# Patient Record
Sex: FEMALE | Race: WHITE | URBAN - METROPOLITAN AREA
[De-identification: names, ages, dates, MRNs, and addresses within clinical notes are randomized per-mention and may not be internally consistent; named-entity substitution may affect disease eponyms.]

---

## 2024-08-08 ENCOUNTER — HOSPITAL ENCOUNTER (EMERGENCY)
Facility: HOSPITAL | Age: 19
Discharge: HOME | End: 2024-08-08
Attending: STUDENT IN AN ORGANIZED HEALTH CARE EDUCATION/TRAINING PROGRAM

## 2024-08-08 ENCOUNTER — APPOINTMENT (OUTPATIENT)
Dept: RADIOLOGY | Facility: HOSPITAL | Age: 19
End: 2024-08-08

## 2024-08-08 VITALS
TEMPERATURE: 97.9 F | BODY MASS INDEX: 31.17 KG/M2 | SYSTOLIC BLOOD PRESSURE: 122 MMHG | OXYGEN SATURATION: 100 % | WEIGHT: 175.93 LBS | DIASTOLIC BLOOD PRESSURE: 69 MMHG | HEART RATE: 62 BPM | RESPIRATION RATE: 18 BRPM | HEIGHT: 63 IN

## 2024-08-08 DIAGNOSIS — S63.695A OTHER SPRAIN OF LEFT RING FINGER, INITIAL ENCOUNTER: ICD-10-CM

## 2024-08-08 DIAGNOSIS — S43.401A SPRAIN OF RIGHT SHOULDER, UNSPECIFIED SHOULDER SPRAIN TYPE, INITIAL ENCOUNTER: ICD-10-CM

## 2024-08-08 DIAGNOSIS — S09.90XA CLOSED HEAD INJURY, INITIAL ENCOUNTER: Primary | ICD-10-CM

## 2024-08-08 DIAGNOSIS — S13.9XXA NECK SPRAIN, INITIAL ENCOUNTER: ICD-10-CM

## 2024-08-08 PROCEDURE — 70450 CT HEAD/BRAIN W/O DYE: CPT

## 2024-08-08 PROCEDURE — 73030 X-RAY EXAM OF SHOULDER: CPT | Mod: RIGHT SIDE | Performed by: RADIOLOGY

## 2024-08-08 PROCEDURE — 70486 CT MAXILLOFACIAL W/O DYE: CPT

## 2024-08-08 PROCEDURE — 73030 X-RAY EXAM OF SHOULDER: CPT | Mod: RT

## 2024-08-08 PROCEDURE — 99285 EMERGENCY DEPT VISIT HI MDM: CPT | Mod: 25

## 2024-08-08 PROCEDURE — 73140 X-RAY EXAM OF FINGER(S): CPT | Mod: LEFT SIDE | Performed by: RADIOLOGY

## 2024-08-08 PROCEDURE — 72125 CT NECK SPINE W/O DYE: CPT

## 2024-08-08 PROCEDURE — 73140 X-RAY EXAM OF FINGER(S): CPT | Mod: LT

## 2024-08-08 PROCEDURE — 76377 3D RENDER W/INTRP POSTPROCES: CPT

## 2024-08-08 RX ORDER — ACETAMINOPHEN 325 MG/1
650 TABLET ORAL ONCE
Status: COMPLETED | OUTPATIENT
Start: 2024-08-08 | End: 2024-08-08

## 2024-08-08 RX ADMIN — ACETAMINOPHEN 650 MG: 325 TABLET ORAL at 13:04

## 2024-08-08 ASSESSMENT — PAIN DESCRIPTION - DESCRIPTORS: DESCRIPTORS: STABBING;THROBBING

## 2024-08-08 ASSESSMENT — PAIN SCALES - GENERAL
PAINLEVEL_OUTOF10: 6
PAINLEVEL_OUTOF10: 3

## 2024-08-08 ASSESSMENT — PAIN DESCRIPTION - ONSET: ONSET: SUDDEN

## 2024-08-08 ASSESSMENT — PAIN DESCRIPTION - ORIENTATION: ORIENTATION: RIGHT

## 2024-08-08 ASSESSMENT — PAIN DESCRIPTION - FREQUENCY: FREQUENCY: INTERMITTENT

## 2024-08-08 ASSESSMENT — PAIN DESCRIPTION - PROGRESSION: CLINICAL_PROGRESSION: NOT CHANGED

## 2024-08-08 ASSESSMENT — PAIN DESCRIPTION - PAIN TYPE: TYPE: ACUTE PAIN

## 2024-08-08 ASSESSMENT — COLUMBIA-SUICIDE SEVERITY RATING SCALE - C-SSRS
1. IN THE PAST MONTH, HAVE YOU WISHED YOU WERE DEAD OR WISHED YOU COULD GO TO SLEEP AND NOT WAKE UP?: NO
2. HAVE YOU ACTUALLY HAD ANY THOUGHTS OF KILLING YOURSELF?: NO
6. HAVE YOU EVER DONE ANYTHING, STARTED TO DO ANYTHING, OR PREPARED TO DO ANYTHING TO END YOUR LIFE?: NO

## 2024-08-08 ASSESSMENT — PAIN - FUNCTIONAL ASSESSMENT
PAIN_FUNCTIONAL_ASSESSMENT: 0-10
PAIN_FUNCTIONAL_ASSESSMENT: 0-10

## 2024-08-08 ASSESSMENT — PAIN DESCRIPTION - LOCATION: LOCATION: SHOULDER

## 2024-08-08 NOTE — Clinical Note
Karan Chowdhury was seen and treated in our emergency department on 8/8/2024.  She may return to work on 08/11/2024.  1-3 days if needed      If you have any questions or concerns, please don't hesitate to call.      Maryann Ch, APRN-CNP

## 2024-08-08 NOTE — ED PROVIDER NOTES
Supervisory note:  Patient seen in conjunction with Maryann Ch NP.  Patient presents after a fall.  She states that she was going down some stairs and her 6 dogs ran down at the same time.  She tripped on one of the dogs and fell down the stairs.  She reports pain in her right shoulder and left ring finger.  She is otherwise healthy and does not use blood thinning medications.  On examination, patient is awake and alert.  There is some bruising noted on the left ring finger.  No significant tenderness to palpation of posterior aspect of left shoulder although the anterior aspect is somewhat tender.  Full range of flexion extension, and abduction of right shoulder.  Strength of flexion and extension of left ring finger is intact.    Imaging studies without acute findings.  Patient advised to use Tylenol and ibuprofen and follow-up with primary care physician.  Patient advised that if pain persists and shoulder, that she should follow-up with orthopedics.  I personally saw the patient and made/approved the management plan and take responsiblity for the patient management.  Parts of this chart were completed with dictation software, please excuse any errors in transcription.     Jay Larsen MD  08/08/24 [id number]

## 2024-08-08 NOTE — DISCHARGE INSTRUCTIONS
Close head injury precautions.  Limit your screen time TV time.  Sit in a quiet room a couple hours a day to allow the brain to rest.  Follow-up with primary care physician in 2 days for reevaluation  Light stretching of the shoulder neck and finger avoid painful activity.  If pain persist follow-up with orthopedics follow-up with primary care physician in 2 days for reevaluation  Ice for 20 minutes at a time 4 times a day and after activity  Where finger splint for support and comfort for couple days then as needed  Try scheduling Tylenol and Motrin to help with pain do not go the recommended daily dosage return with any worsening symptoms or concerns

## 2024-08-08 NOTE — ED PROVIDER NOTES
Department of Emergency Medicine   ED  Provider Note  Admit Date/RoomTime: 8/8/2024 12:49 PM  ED Room: ST26/ST26        History of Present Illness:  Chief Complaint   Patient presents with    Fall     At 0930 I fell down 9 stairs the stairs were carpeted the landing was tile I hit my lt chin and rt shoulder and my lt ring finger the pain in my shoulder is the worst it is stabbing on movement and throbbing just sitting here the rest of my body is sore          Karan Chowdhury is a 19 y.o. female denies chronic medical illnesses presents to the emergency department complaints of fall down 9 steps.  Patient reports around 930 this morning she tripped over her dog tumbling down 9 steps.  She was dazed after the fall saw stars.  No loss of consciousness no nausea no vomiting no complaints of headache.  Complains of a tightness in the right side of her neck but believes is compensating due to her pain in her right shoulder.  She denies pain with movement of the neck.  No loss of bowel or bladder control no numbness or tingling to the groin.  Complains of pain to the right shoulder with movement.  Family present reports swelling to the right shoulder.  No numbness or tingling no weakness or change in temperature or color.  Reports difficulty moving the arm.  She is right-hand dominant.  Complains of left ring finger swelling and bruising difficulty moving.  States she was in her normal state of health prior to this no one is harming her.  She denies chest pain shortness of breath dizziness nausea vomiting.  Denies pregnancy.  Complains of left jaw pain.  No difficulty opening or shutting the jaw.  Her teeth are intact.  No difficulty swallowing.    Review of Systems:   Pertinent positives and negatives are stated within HPI, all other systems reviewed and are negative.        --------------------------------------------- PAST HISTORY ---------------------------------------------  Past Medical History:  has no past medical  history on file.  Past Surgical History:  has no past surgical history on file.  Social History:    Family History: family history is not on file.. Unless otherwise noted, family history is non contributory  The patient’s home medications have been reviewed.  Allergies: Patient has no known allergies.        ---------------------------------------------------PHYSICAL EXAM--------------------------------------    GENERAL APPEARANCE: Awake and alert.   VITAL SIGNS: As per the nurses' triage record.   HEENT: Normocephalic, atraumatic.  No raccoon eyes or strong signs noted.  No epistaxis noted.  No bite to the tongue or lip.  No hemotympanum noted.  No septal hematoma noted.  No contusions abrasions lacerations noted to the face or scalp.  Extraocular muscles are intact. Pupils equal round and reactive to light. Conjunctiva are pink. Negative scleral icterus. Mucous membranes are moist. Tongue in the midline. Pharynx was without erythema or exudates, uvula midline teeth are intact.  No bruising noted to the face or jaw.  Full range of motion of the jaw.  No pain palpation.  NECK: Soft Nontender and supple, full gross ROM, no meningeal signs.  No nuchal rigidity stridor or trismus noted.  No pain palpation of the cervical spine full range of motion without pain.  tenderness to palpation over the right sternomastoid muscle.  No pain with palpation over the left sternomastoid muscle or trapezius muscles.  CHEST: Nontender to palpation. Clear to auscultation bilaterally. No rales, rhonchi, or wheezing.  No flail chest noted  HEART: S1, S2. Regular rate and rhythm. No murmurs, gallops or rubs.  Strong and equal pulses in the extremities.   ABDOMEN: Soft, nontender, nondistended, positive bowel sounds, no palpable masses.  MUSCULCSKELETAL:   Right upper extremity pain with crossarm test along the anterior portion of the shoulder.  Shoulders are symmetrical.  No bruising swelling or redness or warmth noted over the clavicles.  " No redness bruising or swelling noted to the shoulder.  Able to perform liftoff test.  Also able to raise the arm above the head.  Handgrip is strong hands better strong radial pulse strong and intact sensation intact.  No pain with internal or external rotation of the elbow.  No bruising or edema noted to the elbow  Left upper extremity left fourth digit.  Bruising and swelling noted between the PIP and DIP joint.  Tenderness to palpation.  Limited range of motion at the DIP joint.  Good strength against resistance cap refill less than 2 sensation intact.  Radial pulse strong intact cap refill less than 2 sensation intact.  No pain with palpation over the metacarpals.  Full range of motion of the wrist without pain.  No bruising or edema noted full range of motion of the elbow and shoulder.  Ambulates with no difficulty peripheral pulses intact sensation intact  NEUROLOGICAL: Awake, alert and oriented x 3. Power intact in the upper and lower extremities. Sensation is intact to light touch in the upper and lower extremities.   IMMUNOLOGICAL: No lymphatic streaking noted   DERM: No petechiae, rashes, or ecchymoses.          ------------------------- NURSING NOTES AND VITALS REVIEWED ---------------------------  The nursing notes within the ED encounter and vital signs as below have been reviewed by myself  /69 (BP Location: Left arm, Patient Position: Sitting)   Pulse 62   Temp 36.6 °C (97.9 °F) (Temporal)   Resp 18   Ht 1.6 m (5' 3\")   Wt 79.8 kg (175 lb 14.8 oz)   LMP 08/01/2024   SpO2 100%   BMI 31.16 kg/m²     Oxygen Saturation Interpretation: 100% room air      The patient’s available past medical records and past encounters were reviewed.          -----------------------DIAGNOSTIC RESULTS------------------------  LABS:    Labs Reviewed - No data to display    As interpreted by me, the above displayed labs are abnormal. All other labs obtained during this visit were within normal range or not " returned as of this dictation.      CT head wo IV contrast   Final Result   Face: No acute facial bone fracture visualized.        Head: No acute fracture or intracranial hemorrhage.        Cervical spine: No acute fracture or subluxation.        MACRO:   None        Signed by: Martin Lau 8/8/2024 1:59 PM   Dictation workstation:   [id number]      CT cervical spine wo IV contrast   Final Result   Face: No acute facial bone fracture visualized.        Head: No acute fracture or intracranial hemorrhage.        Cervical spine: No acute fracture or subluxation.        MACRO:   None        Signed by: Martin Lau 8/8/2024 1:59 PM   Dictation workstation:   [id number]      CT maxillofacial bones wo IV contrast   Final Result   Face: No acute facial bone fracture visualized.        Head: No acute fracture or intracranial hemorrhage.        Cervical spine: No acute fracture or subluxation.        MACRO:   None        Signed by: Martin Lau 8/8/2024 1:59 PM   Dictation workstation:   [id number]      XR shoulder right 2+ views   Final Result   1. No evidence of acute fracture or dislocation.        MACRO:   None.        Signed by: Alejandro Giordano 8/8/2024 1:35 PM   Dictation workstation:   [id number]      XR fingers left 2+ views   Final Result   1. No fracture or dislocation identified.        MACRO:   None.        Signed by: Alejandro Giordano 8/8/2024 1:34 PM   Dictation workstation:   [id number]              CT head wo IV contrast   Final Result   Face: No acute facial bone fracture visualized.        Head: No acute fracture or intracranial hemorrhage.        Cervical spine: No acute fracture or subluxation.        MACRO:   None        Signed by: Martin Lau 8/8/2024 1:59 PM   Dictation workstation:   [id number]      CT cervical spine wo IV contrast   Final Result   Face: No acute facial bone fracture visualized.        Head: No acute fracture or intracranial hemorrhage.        Cervical spine: No acute  fracture or subluxation.        MACRO:   None        Signed by: Martin Lau 8/8/2024 1:59 PM   Dictation workstation:   [id number]      CT maxillofacial bones wo IV contrast   Final Result   Face: No acute facial bone fracture visualized.        Head: No acute fracture or intracranial hemorrhage.        Cervical spine: No acute fracture or subluxation.        MACRO:   None        Signed by: Martin Lau 8/8/2024 1:59 PM   Dictation workstation:   [id number]      XR shoulder right 2+ views   Final Result   1. No evidence of acute fracture or dislocation.        MACRO:   None.        Signed by: Alejandro Giordano 8/8/2024 1:35 PM   Dictation workstation:   [id number]      XR fingers left 2+ views   Final Result   1. No fracture or dislocation identified.        MACRO:   None.        Signed by: Alejandro Giordano 8/8/2024 1:34 PM   Dictation workstation:   [id number]              ------------------------------ ED COURSE/MEDICAL DECISION MAKING----------------------  Medical Decision Making:   Exam: A medically appropriate exam performed, outlined above, given the known history and presentation.    History obtained from: Review medical record nursing notes patient      Social Determinants of Health considered during this visit: Takes care of herself at home      PAST MEDICAL HISTORY/Chronic Conditions Affecting Care     has no past medical history on file.       CC/HPI Summary, Social Determinants of health, Records Reviewed, DDx, testing done/not done, ED Course, Reassessment, disposition considerations/shared decision making with patient, consults, disposition:   Presents with fall down 9 steps earlier today.  Complains of left jaw pain, neck tenderness, left finger pain and right shoulder pain  Plan  Tylenol    CT brain Face: No acute facial bone fracture visualized.      Head: No acute fracture or intracranial hemorrhage.      Cervical spine: No acute fracture or subluxation.    X-ray left finger 1. No fracture  or dislocation identified.   X-ray right shoulder 1. No evidence of acute fracture or dislocation.   Medical Decision Making/Differential Diagnosis:  Differentials include not limited to closed head injury versus intercranial bleed or cervical strain for cervical fracture versus facial fracture versus left finger fracture versus contusion versus sprain strain versus right shoulder fracture versus contusion versus sprain strain.  Full range of motion of all extremities low concern for dislocation no concerns of compartment syndrome.  She is neurologically intact.  Patient denies anyone harming her.  CT of the head showed no acute fracture or intracranial hemorrhage.  CT of the face showed no acute facial bone fracture visualized.  CT cervical no acute fracture or subluxation.  X-ray of the finger left ring finger no fracture or dislocation identified.  Patient was placed in aluminum static finger splint for support advised Tylenol Motrin for pain.  X-ray of the right shoulder no evidence of acute fracture or dislocation.  Rotator cuff injury versus ligament injury has not been completely ruled out.  Will advise light stretching.  Tylenol Motrin for pain follow-up with orthopedics if no improvement close follow-up with primary care physician.  Close head injury precautions brain rest limit screen time and TV time.  Return with any worsening symptoms or concerns increase fluids  Patient seen evaluated with attending physician Dr. Larsen  Patient amenable to discharge ambulatory  [facility] for discharge  Discharge planning    PROCEDURES  Unless otherwise noted below, none  Splint Application    Performed by: FRANCI Chappell-CNP  Authorized by: Jay Larsen MD    Consent:     Consent obtained:  Verbal    Consent given by:  Patient    Risks, benefits, and alternatives were discussed: yes      Risks discussed:  Discoloration, numbness, swelling and pain    Alternatives discussed:  Referral  Universal protocol:      Procedure explained and questions answered to patient or proxy's satisfaction: yes      Relevant documents present and verified: yes      Test results available: yes      Imaging studies available: yes      Required blood products, implants, devices, and special equipment available: yes      Site/side marked: yes      Immediately prior to procedure a time out was called: yes      Patient identity confirmed:  Verbally with patient and arm band  Pre-procedure details:     Distal neurologic exam:  Normal    Distal perfusion: distal pulses strong and brisk capillary refill    Procedure details:     Location:  Finger    Finger location:  L ring finger    Strapping: no      Splint type:  Finger    Supplies:  Cotton padding, aluminum splint and elastic bandage    Attestation: Splint applied and adjusted personally by me    Post-procedure details:     Distal neurologic exam:  Normal    Distal perfusion: distal pulses strong and brisk capillary refill      Procedure completion:  Tolerated well, no immediate complications  Comments:      Patient placed in position of comfort.  X-ray reviewed no acute fracture.  Extra padding placed along bony prominences.  Splint applied to help protect the finger due to sprain with swelling.  Neurovascular status remained the same before and after placement.  Cap refill less than 2 sensation intact.  Patient vies to wear the splint for couple days to help promote healing.  If no improvement follow-up with orthopedics       CONSULTS:   None      Diagnoses as of 08/08/24 1709   Closed head injury, initial encounter   Neck sprain, initial encounter   Sprain of right shoulder, unspecified shoulder sprain type, initial encounter   Other sprain of left ring finger, initial encounter         This patient has remained hemodynamically stable during their ED course.      Critical Care: none       Counseling:  The emergency provider has spoken with the patient family and discussed today’s results, in  addition to providing specific details for the plan of care and counseling regarding the diagnosis and prognosis.  Questions are answered at this time and they are agreeable with the plan.         --------------------------------- IMPRESSION AND DISPOSITION ---------------------------------    IMPRESSION  1. Closed head injury, initial encounter    2. Neck sprain, initial encounter    3. Sprain of right shoulder, unspecified shoulder sprain type, initial encounter    4. Other sprain of left ring finger, initial encounter        DISPOSITION  Disposition: Discharge home  Patient condition is stable        NOTE: This report was transcribed using voice recognition software. Every effort was made to ensure accuracy; however, inadvertent computerized transcription errors may be present      FRANCI Chappell-JENA  08/08/24 [id number]

## 2025-01-07 ENCOUNTER — APPOINTMENT (OUTPATIENT)
Dept: PRIMARY CARE | Facility: CLINIC | Age: 20
End: 2025-01-07
Payer: COMMERCIAL

## 2025-08-02 ENCOUNTER — OFFICE VISIT (OUTPATIENT)
Dept: URGENT CARE | Age: 20
End: 2025-08-02
Payer: COMMERCIAL

## 2025-08-02 VITALS
DIASTOLIC BLOOD PRESSURE: 73 MMHG | WEIGHT: 150 LBS | BODY MASS INDEX: 26.58 KG/M2 | SYSTOLIC BLOOD PRESSURE: 111 MMHG | HEIGHT: 63 IN | OXYGEN SATURATION: 97 % | TEMPERATURE: 98 F | RESPIRATION RATE: 19 BRPM | HEART RATE: 78 BPM

## 2025-08-02 DIAGNOSIS — L03.012 PARONYCHIA, FINGER, LEFT: Primary | ICD-10-CM

## 2025-08-02 RX ORDER — CEPHALEXIN 500 MG/1
500 CAPSULE ORAL 3 TIMES DAILY
Qty: 30 CAPSULE | Refills: 0 | Status: SHIPPED | OUTPATIENT
Start: 2025-08-02 | End: 2025-08-12

## 2025-08-02 RX ORDER — MUPIROCIN 20 MG/G
1 OINTMENT TOPICAL 2 TIMES DAILY
Qty: 30 G | Refills: 0 | Status: SHIPPED | OUTPATIENT
Start: 2025-08-02

## 2025-08-02 ASSESSMENT — PATIENT HEALTH QUESTIONNAIRE - PHQ9
SUM OF ALL RESPONSES TO PHQ9 QUESTIONS 1 AND 2: 0
2. FEELING DOWN, DEPRESSED OR HOPELESS: NOT AT ALL
1. LITTLE INTEREST OR PLEASURE IN DOING THINGS: NOT AT ALL

## 2025-08-02 NOTE — PROGRESS NOTES
"Subjective   Patient ID: Karan Chowdhury is a 20 y.o. female. She presents today with a chief complaint of Hand Problem (Infection thumb to the right side of the thumb ). Patient presents with a 5 day history of pain, redness around cuticle of left thumb. She denies drainage. She has adelfo soaking in epsom salts. She is right hand dominant.    History of Present Illness  HPI    Past Medical History  Allergies as of 08/02/2025    (No Known Allergies)       Prescriptions Prior to Admission[1]     Medical History[2]    Surgical History[3]     reports that she has never smoked. She has never been exposed to tobacco smoke. She has never used smokeless tobacco.    Review of Systems  Review of Systems                               Objective    Vitals:    08/02/25 1609   BP: 111/73   Pulse: 78   Resp: 19   Temp: 36.7 °C (98 °F)   SpO2: 97%   Weight: 68 kg (150 lb)   Height: 1.6 m (5' 3\")     Patient's last menstrual period was 06/01/2025 (exact date).    Physical Exam  Vitals and nursing note reviewed.   Constitutional:       General: She is not in acute distress.     Appearance: Normal appearance.     Cardiovascular:      Pulses: Normal pulses.     Musculoskeletal:         General: Normal range of motion.     Skin:     General: Skin is warm.      Capillary Refill: Capillary refill takes less than 2 seconds.      Comments: Erythema, edema and tenderness to palpation of left thumb, proximal nail fold     Neurological:      Mental Status: She is alert.      Sensory: No sensory deficit.      Motor: No weakness.         Procedures    Point of Care Test & Imaging Results from this visit  No results found for this visit on 08/02/25.   Imaging  No results found.    Cardiology, Vascular, and Other Imaging  No other imaging results found for the past 2 days      Diagnostic study results (if any) were reviewed by Jo Stafford MD.    Assessment/Plan   Allergies, medications, history, and pertinent labs/EKGs/Imaging reviewed by Jo" NATALI Stafford MD.     Medical Decision Making      Orders and Diagnoses  There are no diagnoses linked to this encounter.    Medical Admin Record      Patient disposition: Home    Electronically signed by Jo Stafford MD  4:27 PM           [1] (Not in a hospital admission)  [2] History reviewed. No pertinent past medical history.  [3] History reviewed. No pertinent surgical history.

## 2025-08-02 NOTE — PATIENT INSTRUCTIONS
Antibiotics as directed; take with food  Soak thumb in warm water with antibacterial soap, 5 minutes, 3 times daily  Keep covered with Mupirocin and dressing  Follow up with new or worsening symptoms

## 2025-08-14 ENCOUNTER — APPOINTMENT (OUTPATIENT)
Dept: PRIMARY CARE | Facility: CLINIC | Age: 20
End: 2025-08-14
Payer: COMMERCIAL

## 2025-08-14 VITALS
DIASTOLIC BLOOD PRESSURE: 80 MMHG | BODY MASS INDEX: 32.25 KG/M2 | SYSTOLIC BLOOD PRESSURE: 126 MMHG | WEIGHT: 182 LBS | RESPIRATION RATE: 16 BRPM | HEIGHT: 63 IN | TEMPERATURE: 97.7 F | HEART RATE: 66 BPM

## 2025-08-14 DIAGNOSIS — R73.9 HYPERGLYCEMIA: ICD-10-CM

## 2025-08-14 DIAGNOSIS — R53.83 OTHER FATIGUE: ICD-10-CM

## 2025-08-14 DIAGNOSIS — Z86.59 HISTORY OF EATING DISORDER: ICD-10-CM

## 2025-08-14 DIAGNOSIS — R79.89 OTHER SPECIFIED ABNORMAL FINDINGS OF BLOOD CHEMISTRY: ICD-10-CM

## 2025-08-14 DIAGNOSIS — F41.1 GENERALIZED ANXIETY DISORDER: ICD-10-CM

## 2025-08-14 DIAGNOSIS — E66.09 CLASS 1 OBESITY DUE TO EXCESS CALORIES WITHOUT SERIOUS COMORBIDITY WITH BODY MASS INDEX (BMI) OF 32.0 TO 32.9 IN ADULT: Primary | ICD-10-CM

## 2025-08-14 DIAGNOSIS — L30.4 INTERTRIGO: ICD-10-CM

## 2025-08-14 DIAGNOSIS — Z13.6 SCREENING FOR HEART DISEASE: ICD-10-CM

## 2025-08-14 DIAGNOSIS — E66.811 CLASS 1 OBESITY DUE TO EXCESS CALORIES WITHOUT SERIOUS COMORBIDITY WITH BODY MASS INDEX (BMI) OF 32.0 TO 32.9 IN ADULT: Primary | ICD-10-CM

## 2025-08-14 DIAGNOSIS — L08.9 SKIN INFECTION: ICD-10-CM

## 2025-08-14 PROBLEM — S63.695A: Status: RESOLVED | Noted: 2024-08-08 | Resolved: 2025-08-14

## 2025-08-14 PROBLEM — F33.0 MAJOR DEPRESSIVE DISORDER, RECURRENT EPISODE, MILD: Status: ACTIVE | Noted: 2023-03-30

## 2025-08-14 PROBLEM — S43.401A SPRAIN OF SHOULDER, RIGHT: Status: RESOLVED | Noted: 2024-08-08 | Resolved: 2025-08-14

## 2025-08-14 PROBLEM — S09.90XA CLOSED HEAD INJURY: Status: RESOLVED | Noted: 2024-08-08 | Resolved: 2025-08-14

## 2025-08-14 PROBLEM — F33.0 MAJOR DEPRESSIVE DISORDER, RECURRENT EPISODE, MILD: Status: RESOLVED | Noted: 2023-03-30 | Resolved: 2025-08-14

## 2025-08-14 PROBLEM — S13.9XXA NECK SPRAIN: Status: RESOLVED | Noted: 2024-08-08 | Resolved: 2025-08-14

## 2025-08-14 PROCEDURE — 99204 OFFICE O/P NEW MOD 45 MIN: CPT

## 2025-08-14 PROCEDURE — 1036F TOBACCO NON-USER: CPT

## 2025-08-14 PROCEDURE — 3008F BODY MASS INDEX DOCD: CPT

## 2025-08-14 RX ORDER — NYSTATIN 100000 U/G
CREAM TOPICAL 2 TIMES DAILY PRN
Qty: 30 G | Refills: 0 | Status: SHIPPED | OUTPATIENT
Start: 2025-08-14 | End: 2025-08-21

## 2025-08-14 RX ORDER — NYSTATIN 100000 [USP'U]/G
1 POWDER TOPICAL 2 TIMES DAILY PRN
Qty: 60 G | Refills: 0 | Status: SHIPPED | OUTPATIENT
Start: 2025-08-14 | End: 2026-08-14

## 2025-08-14 ASSESSMENT — PATIENT HEALTH QUESTIONNAIRE - PHQ9
1. LITTLE INTEREST OR PLEASURE IN DOING THINGS: NOT AT ALL
SUM OF ALL RESPONSES TO PHQ9 QUESTIONS 1 AND 2: 0
2. FEELING DOWN, DEPRESSED OR HOPELESS: NOT AT ALL

## 2025-08-15 DIAGNOSIS — E66.811 CLASS 1 OBESITY DUE TO EXCESS CALORIES WITHOUT SERIOUS COMORBIDITY WITH BODY MASS INDEX (BMI) OF 32.0 TO 32.9 IN ADULT: Primary | ICD-10-CM

## 2025-08-15 DIAGNOSIS — E66.09 CLASS 1 OBESITY DUE TO EXCESS CALORIES WITHOUT SERIOUS COMORBIDITY WITH BODY MASS INDEX (BMI) OF 32.0 TO 32.9 IN ADULT: Primary | ICD-10-CM

## 2025-08-15 RX ORDER — SEMAGLUTIDE 0.25 MG/.5ML
0.25 INJECTION, SOLUTION SUBCUTANEOUS WEEKLY
Qty: 2 ML | Refills: 11 | Status: SHIPPED | OUTPATIENT
Start: 2025-08-15 | End: 2026-07-11

## 2025-08-17 LAB
25(OH)D3+25(OH)D2 SERPL-MCNC: 22 NG/ML (ref 30–100)
ALBUMIN SERPL-MCNC: 4.8 G/DL (ref 3.6–5.1)
ALP SERPL-CCNC: 54 U/L (ref 31–125)
ALT SERPL-CCNC: 13 U/L (ref 6–29)
ANION GAP SERPL CALCULATED.4IONS-SCNC: 10 MMOL/L (CALC) (ref 7–17)
AST SERPL-CCNC: 15 U/L (ref 10–30)
BILIRUB SERPL-MCNC: 0.6 MG/DL (ref 0.2–1.2)
BUN SERPL-MCNC: 11 MG/DL (ref 7–25)
CALCIUM SERPL-MCNC: 9.5 MG/DL (ref 8.6–10.2)
CHLORIDE SERPL-SCNC: 106 MMOL/L (ref 98–110)
CHOLEST SERPL-MCNC: 239 MG/DL
CHOLEST/HDLC SERPL: 4.3 (CALC)
CO2 SERPL-SCNC: 24 MMOL/L (ref 20–32)
CREAT SERPL-MCNC: 0.71 MG/DL (ref 0.5–0.96)
EGFRCR SERPLBLD CKD-EPI 2021: 123 ML/MIN/1.73M2
ERYTHROCYTE [DISTWIDTH] IN BLOOD BY AUTOMATED COUNT: 12 % (ref 11–15)
EST. AVERAGE GLUCOSE BLD GHB EST-MCNC: 103 MG/DL
EST. AVERAGE GLUCOSE BLD GHB EST-SCNC: 5.7 MMOL/L
GLUCOSE SERPL-MCNC: 93 MG/DL (ref 65–99)
HBA1C MFR BLD: 5.2 %
HCT VFR BLD AUTO: 45.6 % (ref 35–45)
HDLC SERPL-MCNC: 56 MG/DL
HGB BLD-MCNC: 14.6 G/DL (ref 11.7–15.5)
LDLC SERPL CALC-MCNC: 152 MG/DL (CALC)
MCH RBC QN AUTO: 29.9 PG (ref 27–33)
MCHC RBC AUTO-ENTMCNC: 32 G/DL (ref 32–36)
MCV RBC AUTO: 93.4 FL (ref 80–100)
NONHDLC SERPL-MCNC: 183 MG/DL (CALC)
PLATELET # BLD AUTO: 313 THOUSAND/UL (ref 140–400)
PMV BLD REES-ECKER: 11.4 FL (ref 7.5–12.5)
POTASSIUM SERPL-SCNC: 4.2 MMOL/L (ref 3.5–5.3)
PROT SERPL-MCNC: 7.1 G/DL (ref 6.1–8.1)
RBC # BLD AUTO: 4.88 MILLION/UL (ref 3.8–5.1)
SODIUM SERPL-SCNC: 140 MMOL/L (ref 135–146)
TRIGL SERPL-MCNC: 168 MG/DL
TSH SERPL-ACNC: 0.97 MIU/L
VIT B12 SERPL-MCNC: 502 PG/ML (ref 200–1100)
WBC # BLD AUTO: 6.4 THOUSAND/UL (ref 3.8–10.8)

## 2025-08-18 PROBLEM — E55.9 VITAMIN D DEFICIENCY: Status: ACTIVE | Noted: 2025-08-18

## 2025-08-18 PROBLEM — E78.2 MIXED HYPERLIPIDEMIA: Status: ACTIVE | Noted: 2025-08-18

## 2025-08-21 DIAGNOSIS — E78.2 MIXED HYPERLIPIDEMIA: Primary | ICD-10-CM

## 2025-08-21 DIAGNOSIS — E66.09 CLASS 1 OBESITY DUE TO EXCESS CALORIES WITHOUT SERIOUS COMORBIDITY WITH BODY MASS INDEX (BMI) OF 32.0 TO 32.9 IN ADULT: ICD-10-CM

## 2025-08-21 DIAGNOSIS — E66.811 CLASS 1 OBESITY DUE TO EXCESS CALORIES WITHOUT SERIOUS COMORBIDITY WITH BODY MASS INDEX (BMI) OF 32.0 TO 32.9 IN ADULT: ICD-10-CM

## 2025-08-21 RX ORDER — SEMAGLUTIDE 0.25 MG/.5ML
0.25 INJECTION, SOLUTION SUBCUTANEOUS WEEKLY
Qty: 2 ML | Refills: 11 | Status: SHIPPED | OUTPATIENT
Start: 2025-08-21 | End: 2026-07-17

## 2025-08-22 ENCOUNTER — TELEPHONE (OUTPATIENT)
Dept: PRIMARY CARE | Facility: CLINIC | Age: 20
End: 2025-08-22
Payer: COMMERCIAL

## 2025-08-25 ENCOUNTER — TELEPHONE (OUTPATIENT)
Dept: PRIMARY CARE | Facility: CLINIC | Age: 20
End: 2025-08-25
Payer: COMMERCIAL

## 2025-09-25 ENCOUNTER — APPOINTMENT (OUTPATIENT)
Facility: CLINIC | Age: 20
End: 2025-09-25
Payer: COMMERCIAL

## 2025-10-02 ENCOUNTER — APPOINTMENT (OUTPATIENT)
Dept: PRIMARY CARE | Facility: CLINIC | Age: 20
End: 2025-10-02
Payer: COMMERCIAL

## 2025-10-17 ENCOUNTER — APPOINTMENT (OUTPATIENT)
Dept: OBSTETRICS AND GYNECOLOGY | Facility: CLINIC | Age: 20
End: 2025-10-17
Payer: COMMERCIAL